# Patient Record
Sex: MALE | Race: WHITE | URBAN - METROPOLITAN AREA
[De-identification: names, ages, dates, MRNs, and addresses within clinical notes are randomized per-mention and may not be internally consistent; named-entity substitution may affect disease eponyms.]

---

## 2017-02-22 ENCOUNTER — HOSPITAL ENCOUNTER (EMERGENCY)
Facility: CLINIC | Age: 42
Discharge: HOME | End: 2017-02-22
Attending: FAMILY MEDICINE | Admitting: FAMILY MEDICINE
Payer: COMMERCIAL

## 2017-02-22 VITALS
TEMPERATURE: 98.2 F | RESPIRATION RATE: 16 BRPM | DIASTOLIC BLOOD PRESSURE: 79 MMHG | SYSTOLIC BLOOD PRESSURE: 124 MMHG | OXYGEN SATURATION: 97 %

## 2017-02-22 DIAGNOSIS — M54.9 UPPER BACK PAIN: ICD-10-CM

## 2017-02-22 DIAGNOSIS — M54.9 PAIN IN RIGHT PARASPINAL REGION: Primary | ICD-10-CM

## 2017-02-22 PROCEDURE — 99213 OFFICE O/P EST LOW 20 MIN: CPT

## 2017-02-22 PROCEDURE — 99213 OFFICE O/P EST LOW 20 MIN: CPT | Performed by: FAMILY MEDICINE

## 2017-02-22 RX ORDER — CYCLOBENZAPRINE HCL 10 MG
10 TABLET ORAL 3 TIMES DAILY PRN
Qty: 50 TABLET | Refills: 0 | Status: SHIPPED | OUTPATIENT
Start: 2017-02-22 | End: 2017-02-28

## 2017-02-22 NOTE — DISCHARGE INSTRUCTIONS
Follow up with physical therapy especially as this has been ongoing for a while now.  Apply Voltaren gel to help with pain. Ok to take 500mg-1000mg three times a day as needed for pain. When you start physical therapy, you may want to to schedule this to stay ahead of the pain.  Muscle relaxer for as needed use.

## 2017-02-22 NOTE — ED AVS SNAPSHOT
[facility] Emergency Department    [address] [address]    Phone:  [phone]    Fax:  [phone]                                       João Whipple   MRN: [id number]    Department:  [facility] Emergency Department   Date of Visit:  2/22/2017           After Visit Summary Signature Page     I have received my discharge instructions, and my questions have been answered. I have discussed any challenges I see with this plan with the nurse or doctor.    ..........................................................................................................................................  Patient/Patient Representative Signature      ..........................................................................................................................................  Patient Representative Print Name and Relationship to Patient    ..................................................               ................................................  Date                                            Time    ..........................................................................................................................................  Reviewed by Signature/Title    ...................................................              ..............................................  Date                                                            Time

## 2017-02-22 NOTE — ED PROVIDER NOTES
History     Chief Complaint   Patient presents with     Back Pain     Pt started having R posterior rib pain/upper back 2 months ago, continues to get worse.  Denies pain with deep breathing.  Pain is constant with standing.  Pt has periodic back pain, but this won't go away.       HPI  João Whipple is a 41 year old male who presents with pain in right upper back region X 2 months, worsening in the past couple of weeks and especially bad today which is why he is here. Currently rated at about 6/10, up to 7/10 with bending and twisting, relieved when he is able to but pressure on a specific spot on his back. No associated numbness and tingling in his extremities. 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  He has a long history of having a tight back.   He has had 2 massages done and was told that one of his ribs may be out of place.  He tries not to take medications so he has not used anything since onset.    I have reviewed the Medications, Allergies, Past Medical and Surgical History, and Social History in the Epic system.    Review of Systems    Physical Exam   BP: 124/79  Heart Rate: 77  Temp: 98.2  F (36.8  C)  Resp: 16  SpO2: 97 %  Physical Exam   General - Awake and alert, not in any obvious distress. Afebrile, not pale well hydrated.  Musculoskeletal: normal gait and station  Spine: no obvious deformity, mild prominence over right paraspinal region with associated tenderness over the T7 region where he the most pain  Psych - Judgment and mental status are clear, patient has reasonable insight. Mood is stable.          ED Course     ED Course     Procedures        None  Labs Ordered and Resulted from Time of ED Arrival Up to the Time of Departure from the ED - No data to display    Assessments & Plan (with Medical Decision Making)     I have reviewed the nursing notes.    I have reviewed the  findings, diagnosis, plan and need for follow up with the patient.    New Prescriptions    DICLOFENAC (VOLTAREN) 1 % GEL TOPICAL GEL    Apply 4 grams to right up[per back region three times a day using enclosed dosing card.       Final diagnoses:   Pain in right paraspinal region   Upper back pain   Diagnosis, differential diagnosis, prognosis, treatment discuss with patient. Referred to physical therapy. Voltaren gel and Tylenol for pain.    See below for summary discussion with patient    Follow up with physical therapy especially as this has been ongoing for a while now.  Apply Voltaren gel to help with pain. Ok to take 500mg-1000mg three times a day as needed for pain. When you start physical therapy, you may want to to schedule this to stay ahead of the pain.  Muscle relaxer for as needed use.      2/22/2017   [facility] EMERGENCY DEPARTMENT     Clarissa Barnhart MD  02/22/17 2046

## 2017-02-22 NOTE — ED AVS SNAPSHOT
[facility] Emergency Department    [address] [facility] [address]    Phone:  [phone]    Fax:  [phone]                                       João Whipple   MRN: [id number]    Department:  [facility] Emergency Department   Date of Visit:  2/22/2017           Patient Information     Date Of Birth          1975        Your diagnoses for this visit were:     Pain in right paraspinal region     Upper back pain        You were seen by Clarissa Barnhart MD.      Follow-up Information     Follow up with Clinic, [facility].    Why:  As needed    Contact information:    [address] [facility] [address] [phone]          Discharge Instructions       Follow up with physical therapy especially as this has been ongoing for a while now.  Apply Voltaren gel to help with pain. Ok to take 500mg-1000mg three times a day as needed for pain. When you start physical therapy, you may want to to schedule this to stay ahead of the pain.    24 Hour Appointment Hotline       To make an appointment at any [facility] clinic, call [phone] ([phone]). If you don't have a family doctor or clinic, we will help you find one. [facility] clinics are conveniently located to serve the needs of you and your family.          ED Discharge Orders     PHYSICAL THERAPY REFERRAL       *This therapy referral will be filtered to a centralized scheduling office at [facility] and the patient will receive a call to schedule an appointment at a [facility] location most convenient for them. *     [facility] provides Physical Therapy evaluation and treatment and many specialty services across the [facility] system.  If requesting a specialty program, please choose from the list below.    If you have not heard from the scheduling office within 2 business days, please call [phone] for all locations, with the exception of [place], please  call [phone].  Treatment: Evaluation & Treatment  Special Instructions/Modalities: None  Special Programs: Pain management     Please be aware that coverage of these services is subject to the terms and limitations of your health insurance plan.  Call member services at your health plan with any benefit or coverage questions.      **Note to Provider:  If you are referring outside of [facility] for the therapy appointment, please list the name of the location in the  special instructions  above, print the referral and give to the patient to schedule the appointment.                     Review of your medicines      START taking        Dose / Directions Last dose taken    diclofenac 1 % Gel topical gel   Commonly known as:  VOLTAREN   Quantity:  100 g        Apply 4 grams to right up[per back region three times a day using enclosed dosing card.   Refills:  1          Our records show that you are taking the medicines listed below. If these are incorrect, please call your family doctor or clinic.        Dose / Directions Last dose taken    albuterol 108 (90 BASE) MCG/ACT Inhaler   Commonly known as:  albuterol   Dose:  2 puff   Quantity:  1 Inhaler        Inhale 2 puffs into the lungs every 4 hours as needed for shortness of breath / dyspnea   Refills:  0        guaiFENesin-codeine 100-10 MG/5ML Soln solution   Commonly known as:  ROBITUSSIN AC   Dose:  1 tsp.   Quantity:  120 mL        Take 5 mLs by mouth every 4 hours as needed for cough   Refills:  0                Prescriptions were sent or printed at these locations (1 Prescription)                   [facility] [place], [place] - [address]   [address] [place] [address]    Telephone:  [phone]   Fax:  [phone]   Hours:                  E-Prescribed (1 of 1)         diclofenac (VOLTAREN) 1 % GEL topical gel                Orders Needing Specimen Collection     None      Pending Results     No orders found from 2/20/2017 to  "2017.            Pending Culture Results     No orders found from 2017 to 2017.             Test Results from your hospital stay            Thank you for choosing [facility]       Thank you for choosing [facility] for your care. Our goal is always to provide you with excellent care. Hearing back from our patients is one way we can continue to improve our services. Please take a few minutes to complete the written survey that you may receive in the mail after you visit with us. Thank you!        [employer] Information     [employer] lets you send messages to your doctor, view your test results, renew your prescriptions, schedule appointments and more. To sign up, go to [email or web address] . Click on \"Log in\" on the left side of the screen, which will take you to the Welcome page. Then click on \"Sign up Now\" on the right side of the page.     You will be asked to enter the access code listed below, as well as some personal information. Please follow the directions to create your username and password.     Your access code is: [email or web address]  Expires: 2017  3:46 PM     Your access code will  in 90 days. If you need help or a new code, please call your [facility] clinic or [phone].        Care EveryWhere ID     This is your Care EveryWhere ID. This could be used by other organizations to access your [facility] medical records  [id number]        After Visit Summary       This is your record. Keep this with you and show to your community pharmacist(s) and doctor(s) at your next visit.                  "

## 2017-03-02 ENCOUNTER — HOSPITAL ENCOUNTER (OUTPATIENT)
Dept: PHYSICAL THERAPY | Facility: CLINIC | Age: 42
Setting detail: THERAPIES SERIES
End: 2017-03-02
Attending: FAMILY MEDICINE
Payer: COMMERCIAL

## 2017-03-02 PROCEDURE — 97161 PT EVAL LOW COMPLEX 20 MIN: CPT | Mod: GP | Performed by: PHYSICAL THERAPIST

## 2017-03-02 PROCEDURE — 40000718 ZZHC STATISTIC PT DEPARTMENT ORTHO VISIT: Performed by: PHYSICAL THERAPIST

## 2017-03-02 PROCEDURE — 97140 MANUAL THERAPY 1/> REGIONS: CPT | Mod: GP | Performed by: PHYSICAL THERAPIST

## 2017-03-02 PROCEDURE — 97110 THERAPEUTIC EXERCISES: CPT | Mod: GP | Performed by: PHYSICAL THERAPIST

## 2017-03-02 NOTE — PROGRESS NOTES
PHYSICAL THERAPY INITIAL EVALUATION  03/02/17 1500   General Information   Type of Visit Initial OP Ortho PT Evaluation   Start of Care Date 03/02/17   Referring Physician Clarissa Barnhart MD    Patient/Family Goals Statement get rid of the pain    Orders Evaluate and Treat   Date of Order 02/22/17   Medical Diagnosis upper back pain, Pain in right paraspinal region    Surgical/Medical history reviewed Yes   Precautions/Limitations no known precautions/limitations   Body Part(s)   Body Part(s) Cervical Spine   Presentation and Etiology   Pertinent history of current problem (include personal factors and/or comorbidities that impact the POC) Patient reports pain started at the beginning of the year. Starts day ok but worsens throughout the day at work. Pain located at base of shoulder blades. Very rarerly will get a little tingling in the R index finger, very slight. Has tried massage and helps temporarily only but can't keep going back. Standing at sink doing dishes for awhile will bother him, prolonged standing in one spot.     Impairments A. Pain;D. Decreased ROM;E. Decreased flexibility;H. Impaired gait   Functional Limitations perform activities of daily living;perform required work activities;perform desired leisure / sports activities   Symptom Location mid thoracic spine   How/Where did it occur From insidious onset   Onset date of current episode/exacerbation 01/01/17   Chronicity New   Pain rating (0-10 point scale) Best (/10);Worst (/10)   Best (/10) 0   Worst (/10) 7   Pain quality A. Sharp;C. Aching   Frequency of pain/symptoms C. With activity   Pain/symptoms exacerbated by B. Walking;C. Lifting;D. Carrying;L. Work tasks   Pain/symptoms eased by A. Sitting;C. Rest   Progression of symptoms since onset: Worsened   Prior Level of Function   Prior Level of Function-Mobility independent   Prior Level of Function-ADLs independent   Current Level of Function   Patient role/employment history A.  Employed   Employment [employer]   Current equipment-Gait/Locomotion None   Fall Risk Screen   Fall screen completed by PT   Per patient - Fall 2 or more times in past year? No   Per patient - Fall with injury in past year? No   Is patient a fall risk? No   Functional Scales   Functional Scales OPTIMAL   Optimal (1=able to do without difficulty, 2=able to do with little difficulty, 3=able to do with moderate difficulty, 4=able to do with much difficulty, 5=unable to do, 9=NA) Activity 1;Activity 2;Activity 3   Activity 1 comment lifting 3/5   Activity 2 comment carrying 3/5   Activity 3 comment standing 3/5   Cervical Spine   Posture forward head, rounded shoulders   Cervical Flexion ROM WNL   Cervical Extension ROM WNL   Cervical Right Side Bending ROM WNL   Cervical Left Side Bending ROM WNL   Cervical Right Rotation ROM WNL   Cervical Left Rotation ROM WNL   Thoracic Right Rotation 85%, pulling on right side    Thoracic Left Rotation 100%   Shoulder AROM Screen WNL no pain    Shoulder Shrug (C2-C4) Strength 5/5   Shoulder Abd (C5) Strength 5/5   Shoulder ER (C5, C6) Strength 5/5   Shoulder IR (C5, C6) Strength 5/5, mild pain   Elbow Flexion (C5, C6) Strength 5/5   Elbow Extension (C7) Strength 5/5   Wrist Extension (C6) Strength 5/5   Wrist Flexion (C7) Strength 5/5   Thumb Abd (C8) Strength 5/5   5th Finger Add (T1) Strength 5/5   Spurling Test -   Segmental Mobility-Thoracic post rib R T6, ERS R C7, ERS R T5   Planned Therapy Interventions   Planned Therapy Interventions joint mobilization;manual therapy;neuromuscular re-education;ROM;strengthening;stretching   Clinical Impression   Criteria for Skilled Therapeutic Interventions Met yes, treatment indicated   PT Diagnosis thoracic pain   Influenced by the following impairments pain, decreased ROM    Functional limitations due to impairments lifting, standing, walking, sitting    Clinical Presentation Stable/Uncomplicated   Clinical Presentation  Rationale good strength, no signs of nerve impingment or disc involvment, appears mainly mechanical/postural   Clinical Decision Making (Complexity) Low complexity   Therapy Frequency 1 time/week   Predicted Duration of Therapy Intervention (days/wks) 6 weeks   Risk & Benefits of therapy have been explained Yes   Patient, Family & other staff in agreement with plan of care Yes   Clinical Impression Comments Patient presenting with thoracic spine facet and rib dysfunctions.    Education Assessment   Preferred Learning Style Listening;Demonstration;Pictures/video   Barriers to Learning No barriers   ORTHO GOALS   PT Ortho Eval Goals 1;2;3   Ortho Goal 1   Goal Identifier 1   Goal Description Patient will report pain at end of work day 3/10 or less.   Target Date 04/13/17   Ortho Goal 2   Goal Identifier 2   Goal Description Patient will be able to perform lifting with minimal pain or difficulty.    Target Date 04/13/17   Ortho Goal 3   Goal Identifier 3   Goal Description Patient will be independent and compliant with HEP to aid functional recovery.    Target Date 04/13/17   Total Evaluation Time   Total Evaluation Time 20       Please contact me with any questions or concerns.  Thank you for your referral.    Ines Prince, PT, DPT, OCS  Physical Therapist, Orthopedic Certified Specialist  [facility]  [phone]

## 2017-03-15 ENCOUNTER — HOSPITAL ENCOUNTER (OUTPATIENT)
Dept: PHYSICAL THERAPY | Facility: CLINIC | Age: 42
Setting detail: THERAPIES SERIES
End: 2017-03-15
Attending: FAMILY MEDICINE
Payer: COMMERCIAL

## 2017-03-15 PROCEDURE — 97110 THERAPEUTIC EXERCISES: CPT | Mod: GP | Performed by: PHYSICAL THERAPIST

## 2017-03-15 PROCEDURE — 97140 MANUAL THERAPY 1/> REGIONS: CPT | Mod: GP | Performed by: PHYSICAL THERAPIST

## 2017-03-15 PROCEDURE — 40000718 ZZHC STATISTIC PT DEPARTMENT ORTHO VISIT: Performed by: PHYSICAL THERAPIST

## 2017-03-22 ENCOUNTER — HOSPITAL ENCOUNTER (OUTPATIENT)
Dept: PHYSICAL THERAPY | Facility: CLINIC | Age: 42
Setting detail: THERAPIES SERIES
End: 2017-03-22
Attending: FAMILY MEDICINE
Payer: COMMERCIAL

## 2017-03-22 PROCEDURE — 40000718 ZZHC STATISTIC PT DEPARTMENT ORTHO VISIT: Performed by: PHYSICAL THERAPIST

## 2017-03-22 PROCEDURE — 97110 THERAPEUTIC EXERCISES: CPT | Mod: GP | Performed by: PHYSICAL THERAPIST

## 2017-03-22 PROCEDURE — 97140 MANUAL THERAPY 1/> REGIONS: CPT | Mod: GP | Performed by: PHYSICAL THERAPIST

## 2017-08-25 NOTE — ADDENDUM NOTE
Encounter addended by: Ines Prince, PT on: 8/25/2017  2:11 PM<BR>     Actions taken: Sign clinical note, Flowsheet accepted, Episode resolved

## 2017-08-25 NOTE — PROGRESS NOTES
Patient did not return for follow up treatments as directed.  Goal status and current objective information is therefore unknown.  The daily note from the patient's last visit will serve as the discharge note. Discharge from PT services at this time for this episode of treatment. Please see attached documentation under this episode of care for further information including dates of service, start of care date, referring physician, Dx, treatment plan, treatments, etc.    Please contact me with any questions or concerns.  Thank you for your referral.    Ines Prince PT, DPT, OCS  Physical Therapist, Orthopedic Certified Specialist  [facility]  [phone]       PHYSICAL THERAPY DISCHARGE NOTE  03/22/17 1600   Signing Clinician's Name / Credentials   Signing clinician's name / credentials Ines Prince PT, FCOT, OCS   Session Number   Session Number 3 HP   Progress Note/Recertification   Progress Note Due Date 04/13/17   Adult Goals   PT Ortho Eval Goals 1;2;3   Ortho Goal 1   Goal Identifier 1   Goal Description Patient will report pain at end of work day 3/10 or less.   Target Date 04/13/17   Ortho Goal 2   Goal Identifier 2   Goal Description Patient will be able to perform lifting with minimal pain or difficulty.    Target Date 04/13/17   Ortho Goal 3   Goal Identifier 3   Goal Description Patient will be independent and compliant with HEP to aid functional recovery.    Target Date 04/13/17   Subjective Report   Subjective Report Patient reports last Friday he was at the parade and on his feet all day, back didn't bother him all day or the next day. Started feelin some Sunday. 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 Having less pain with just prolonged standing, seems to be more with being active or if standing at sink.      Objective Measures   Objective Measures Objective Measure 1   Objective Measure 1   Objective  Measure Thoracic Rotation   Details R 80% pain on R lower rib, L 100% some pulling     Treatment Interventions   Interventions Therapeutic Procedure/Exercise;Manual Therapy   Therapeutic Procedure/exercise   Minutes 10   Skilled Intervention HEP instruction   Patient Response felt some pulling in R rib area with D2 extension    Treatment Detail seated resisted trunk rotation RTB 2 x 10 B, standing D2 extension YTB 2 x 10 B    Manual Therapy   Minutes 15   Skilled Intervention MT   Patient Response Thoracic rotation 100% B with less pain, could still feel it a little better     Treatment Detail MET: post rib R T6, post rib R T 4, ERS L T4, prone PA grade IV mobs T4-T7.    Education   Learner Patient   Readiness Eager   Method Booklet/handout;Explanation;Demonstration   Response Verbalizes Understanding;Demonstrates Understanding   Plan   Home program above ex   Plan for next session MT, scap strengthw/thoracic mobility   Total Session Time   Total Treatment Time (sum of timed and untimed services) 25, te mt